# Patient Record
Sex: FEMALE | Race: BLACK OR AFRICAN AMERICAN | NOT HISPANIC OR LATINO
[De-identification: names, ages, dates, MRNs, and addresses within clinical notes are randomized per-mention and may not be internally consistent; named-entity substitution may affect disease eponyms.]

---

## 2018-11-05 ENCOUNTER — RX RENEWAL (OUTPATIENT)
Age: 53
End: 2018-11-05

## 2018-11-05 PROBLEM — Z00.00 ENCOUNTER FOR PREVENTIVE HEALTH EXAMINATION: Status: ACTIVE | Noted: 2018-11-05

## 2019-06-26 ENCOUNTER — RESULT REVIEW (OUTPATIENT)
Age: 54
End: 2019-06-26

## 2021-01-19 ENCOUNTER — RESULT REVIEW (OUTPATIENT)
Age: 56
End: 2021-01-19

## 2021-09-07 ENCOUNTER — EMERGENCY (EMERGENCY)
Facility: HOSPITAL | Age: 56
LOS: 1 days | Discharge: HOME | End: 2021-09-07
Attending: STUDENT IN AN ORGANIZED HEALTH CARE EDUCATION/TRAINING PROGRAM | Admitting: STUDENT IN AN ORGANIZED HEALTH CARE EDUCATION/TRAINING PROGRAM
Payer: COMMERCIAL

## 2021-09-07 VITALS
SYSTOLIC BLOOD PRESSURE: 158 MMHG | DIASTOLIC BLOOD PRESSURE: 85 MMHG | HEART RATE: 70 BPM | TEMPERATURE: 98 F | RESPIRATION RATE: 18 BRPM | OXYGEN SATURATION: 96 %

## 2021-09-07 LAB
ALBUMIN SERPL ELPH-MCNC: 4.8 G/DL — SIGNIFICANT CHANGE UP (ref 3.3–5)
ALP SERPL-CCNC: 90 U/L — SIGNIFICANT CHANGE UP (ref 40–120)
ALT FLD-CCNC: 29 U/L — SIGNIFICANT CHANGE UP (ref 4–33)
ANION GAP SERPL CALC-SCNC: 15 MMOL/L — HIGH (ref 7–14)
AST SERPL-CCNC: 22 U/L — SIGNIFICANT CHANGE UP (ref 4–32)
BASOPHILS # BLD AUTO: 0.04 K/UL — SIGNIFICANT CHANGE UP (ref 0–0.2)
BASOPHILS NFR BLD AUTO: 0.5 % — SIGNIFICANT CHANGE UP (ref 0–2)
BILIRUB SERPL-MCNC: 0.4 MG/DL — SIGNIFICANT CHANGE UP (ref 0.2–1.2)
BUN SERPL-MCNC: 12 MG/DL — SIGNIFICANT CHANGE UP (ref 7–23)
CALCIUM SERPL-MCNC: 10.5 MG/DL — SIGNIFICANT CHANGE UP (ref 8.4–10.5)
CHLORIDE SERPL-SCNC: 101 MMOL/L — SIGNIFICANT CHANGE UP (ref 98–107)
CO2 SERPL-SCNC: 27 MMOL/L — SIGNIFICANT CHANGE UP (ref 22–31)
CREAT SERPL-MCNC: 0.75 MG/DL — SIGNIFICANT CHANGE UP (ref 0.5–1.3)
EOSINOPHIL # BLD AUTO: 0.08 K/UL — SIGNIFICANT CHANGE UP (ref 0–0.5)
EOSINOPHIL NFR BLD AUTO: 1 % — SIGNIFICANT CHANGE UP (ref 0–6)
GLUCOSE SERPL-MCNC: 104 MG/DL — HIGH (ref 70–99)
HCT VFR BLD CALC: 44.2 % — SIGNIFICANT CHANGE UP (ref 34.5–45)
HGB BLD-MCNC: 14.6 G/DL — SIGNIFICANT CHANGE UP (ref 11.5–15.5)
IANC: 5.3 K/UL — SIGNIFICANT CHANGE UP (ref 1.5–8.5)
IMM GRANULOCYTES NFR BLD AUTO: 0.2 % — SIGNIFICANT CHANGE UP (ref 0–1.5)
LYMPHOCYTES # BLD AUTO: 2.38 K/UL — SIGNIFICANT CHANGE UP (ref 1–3.3)
LYMPHOCYTES # BLD AUTO: 28.6 % — SIGNIFICANT CHANGE UP (ref 13–44)
MCHC RBC-ENTMCNC: 29.7 PG — SIGNIFICANT CHANGE UP (ref 27–34)
MCHC RBC-ENTMCNC: 33 GM/DL — SIGNIFICANT CHANGE UP (ref 32–36)
MCV RBC AUTO: 90 FL — SIGNIFICANT CHANGE UP (ref 80–100)
MONOCYTES # BLD AUTO: 0.51 K/UL — SIGNIFICANT CHANGE UP (ref 0–0.9)
MONOCYTES NFR BLD AUTO: 6.1 % — SIGNIFICANT CHANGE UP (ref 2–14)
NEUTROPHILS # BLD AUTO: 5.3 K/UL — SIGNIFICANT CHANGE UP (ref 1.8–7.4)
NEUTROPHILS NFR BLD AUTO: 63.6 % — SIGNIFICANT CHANGE UP (ref 43–77)
NRBC # BLD: 0 /100 WBCS — SIGNIFICANT CHANGE UP
NRBC # FLD: 0 K/UL — SIGNIFICANT CHANGE UP
PLATELET # BLD AUTO: 346 K/UL — SIGNIFICANT CHANGE UP (ref 150–400)
POTASSIUM SERPL-MCNC: 3.8 MMOL/L — SIGNIFICANT CHANGE UP (ref 3.5–5.3)
POTASSIUM SERPL-SCNC: 3.8 MMOL/L — SIGNIFICANT CHANGE UP (ref 3.5–5.3)
PROT SERPL-MCNC: 8.5 G/DL — HIGH (ref 6–8.3)
RBC # BLD: 4.91 M/UL — SIGNIFICANT CHANGE UP (ref 3.8–5.2)
RBC # FLD: 13.3 % — SIGNIFICANT CHANGE UP (ref 10.3–14.5)
SODIUM SERPL-SCNC: 143 MMOL/L — SIGNIFICANT CHANGE UP (ref 135–145)
WBC # BLD: 8.33 K/UL — SIGNIFICANT CHANGE UP (ref 3.8–10.5)
WBC # FLD AUTO: 8.33 K/UL — SIGNIFICANT CHANGE UP (ref 3.8–10.5)

## 2021-09-07 PROCEDURE — 70450 CT HEAD/BRAIN W/O DYE: CPT | Mod: 26

## 2021-09-07 PROCEDURE — 99285 EMERGENCY DEPT VISIT HI MDM: CPT

## 2021-09-07 NOTE — ED PROVIDER NOTE - CLINICAL SUMMARY MEDICAL DECISION MAKING FREE TEXT BOX
Pt is a 55 y/o female c/o a 30 min episode of blurry vision and brain fog this morning that subsequently resolved. R/o intracranial mass with imaging and screen for cardiac events with EKG. Pt is a 55 y/o female c/o a 30 min episode of blurry vision and brain fog this morning that subsequently resolved. R/o intracranial mass with imaging and screen for cardiac events with EKG.    Please see attending attestation.

## 2021-09-07 NOTE — ED PROVIDER NOTE - OBJECTIVE STATEMENT
Pt is a 55 y/o female c/o a 30 min episode of blurry vision and brain fog this morning that subsequently resolved. Pt measured her BP to be 170/92 at that time. Pt also had a bilateral pounding headache 4 days ago that was resolved with Tylenol. Pt went to her PCP today and was told to come to the ER for a CT head scan. Pt denies paresthesias, dizziness, chest pain, SOB, abd pain. PMH includes HTN and HLD. Denies smoking, drinking, and illicit drug use.

## 2021-09-07 NOTE — ED PROVIDER NOTE - NS ED ROS FT
CONSTITUTIONAL: No fever, weight loss, or fatigue  EYES: No eye pain, + Blurry vision, no discharge  ENMT:  No difficulty hearing, tinnitus, vertigo; No sinus or throat pain  NECK: No pain or stiffness  RESPIRATORY: No cough, wheezing, chills or hemoptysis; No shortness of breath  CARDIOVASCULAR: No chest pain, palpitations, dizziness, or leg swelling  GASTROINTESTINAL: No abdominal or epigastric pain. No nausea, vomiting, or hematemesis; No diarrhea or constipation. No melena or hematochezia.  GENITOURINARY: No dysuria, frequency, hematuria, or incontinence  NEUROLOGICAL: + Headaches, no memory loss, loss of strength, numbness, or tremors  SKIN: No itching, burning, rashes, or lesions

## 2021-09-07 NOTE — ED PROVIDER NOTE - PHYSICAL EXAMINATION
GENERAL: well appearing in no acute distress, non-toxic appearing  HEAD: normocephalic, atraumatic  HEENT: normal conjunctiva, oral mucosa moist, uvula midline, no tonsilar exudates, neck supple, no JVD  CARDIAC: regular rate and rhythm, normal S1S2, no appreciable murmurs, 2+ pulses in UE/LE b/l  PULM: normal breath sounds, clear to ascultation bilaterally, no rales, rhonchi, wheezing  GI: abdomen nondistended, soft, nontender, no guarding, rebound tenderness  : no CVA tenderness b/l, no suprapubic tenderness  NEURO: no focal motor or sensory deficits, CN2-12 intact, normal speech, PERRLA, EOMI, AAOx3  MSK: no peripheral edema, no calf tenderness b/l  SKIN: well-perfused, extremities warm, no visible rashes  PSYCH: appropriate mood and affect

## 2021-09-07 NOTE — ED PROVIDER NOTE - ATTENDING CONTRIBUTION TO CARE
I have personally performed a face to face medical and diagnostic evaluation of the patient. I have discussed with and reviewed the Resident's note and agree with the History, ROS, Physical Exam and MDM unless otherwise indicated. A brief summary of my personal evaluation and impression can be found below. I have personally performed a face to face medical and diagnostic evaluation of the patient. I have discussed with and reviewed the Resident's note and agree with the History, ROS, Physical Exam and MDM unless otherwise indicated. A brief summary of my personal evaluation and impression can be found below.    55yo F hx htn hld p/w 30 min episode this AM of blurry vision (unclear if it was unilateral or b/l) not described as loss of vision or blacking of vision w/ episode of her thoughts being "foggy" but still had orientation that self resolved. Also endorsed generalized HA 4 days ago that resolved with tylenol, has hx of similar HA. Has had similar episodes of blurry vision chronically in the past seen by an optho and told had dry eyes and given artificial tears but did not take for this episode. Also has had episodes of this "brain fog" before and attributes it to menopause. Was concerned 2/2 the brain fog and blurry vision happening at the same time which has never happened before. Went to PCP today and sent for CT. Denies any focal weakness/numbness or facial droop or confusion/LOC, cp/sob/palpitations during this episode.   VITALS: Initial triage and subsequent vitals have been reviewed by me.  Gen: Well appearing, NAD, alert, non-toxic  Head: NCAT  HEENT: PERRL, MMM, normal conjunctiva, anicteric, neck supple  Lung: CTAB, no adventitious sounds  CV: RRR, no murmurs, 2+symmetric peripheral pulses  Abd: soft, NTND, no rebound or guarding, no palpable masses  MSK: No edema, no visible deformities  Neuro: Following commands appropriately, fluid speech, CN II-XII grossly intact. 5/5 strength and normal sensation in all extremities. Ambulatory with stable gait.  Skin: Warm and dry, no evidence of rash  Psych: normal mood and affect  Unclear etiology of event, do not suspect stroke/tia/CRAO/amaurosis fugax as lack of features that are typical of stroke presentation and vision changes are not described as loss of vision in a monocular fashion. Brain "fog" not described as loss of orientation/amnesia do not suspect TGA. Will observe, check labs, and CTH and dispo to fu with pcp withs trict return precautions

## 2021-09-07 NOTE — ED PROVIDER NOTE - CARE PLAN
1 Principal Discharge DX:	TTH (tension-type headache)   Principal Discharge DX:	Other headache syndrome

## 2021-09-07 NOTE — ED PROVIDER NOTE - PATIENT PORTAL LINK FT
You can access the FollowMyHealth Patient Portal offered by [facility] by registering at the following website: [email or web address]. By joining [employer]’s FollowMyHealth portal, you will also be able to view your health information using other applications (apps) compatible with our system.

## 2021-09-07 NOTE — ED ADULT NURSE NOTE - OBJECTIVE STATEMENT
received pt in bed A and Ox 3 in NAD resting comfortably, c/o dizziness for the past 5 days, denies Ha, injury, changes in vision, PERRLA extremities are strong and equal b/l. orders noted and completed.

## 2022-10-26 ENCOUNTER — APPOINTMENT (OUTPATIENT)
Dept: ORTHOPEDIC SURGERY | Facility: CLINIC | Age: 57
End: 2022-10-26

## 2022-10-26 VITALS — WEIGHT: 235 LBS | BODY MASS INDEX: 37.77 KG/M2 | HEIGHT: 66 IN

## 2022-10-26 DIAGNOSIS — S43.51XA SPRAIN OF RIGHT ACROMIOCLAVICULAR JOINT, INITIAL ENCOUNTER: ICD-10-CM

## 2022-10-26 DIAGNOSIS — I10 ESSENTIAL (PRIMARY) HYPERTENSION: ICD-10-CM

## 2022-10-26 DIAGNOSIS — K21.9 GASTRO-ESOPHAGEAL REFLUX DISEASE W/OUT ESOPHAGITIS: ICD-10-CM

## 2022-10-26 DIAGNOSIS — E78.00 PURE HYPERCHOLESTEROLEMIA, UNSPECIFIED: ICD-10-CM

## 2022-10-26 PROCEDURE — 73030 X-RAY EXAM OF SHOULDER: CPT | Mod: RT

## 2022-10-26 PROCEDURE — 73010 X-RAY EXAM OF SHOULDER BLADE: CPT | Mod: RT

## 2022-10-26 PROCEDURE — 99204 OFFICE O/P NEW MOD 45 MIN: CPT

## 2022-10-26 RX ORDER — CYCLOBENZAPRINE HYDROCHLORIDE 5 MG/1
5 TABLET, FILM COATED ORAL
Qty: 21 | Refills: 0 | Status: ACTIVE | COMMUNITY
Start: 2022-05-03

## 2022-10-26 RX ORDER — BUDESONIDE AND FORMOTEROL FUMARATE DIHYDRATE 160; 4.5 UG/1; UG/1
160-4.5 AEROSOL RESPIRATORY (INHALATION)
Qty: 10 | Refills: 0 | Status: ACTIVE | COMMUNITY
Start: 2022-09-28

## 2022-10-26 RX ORDER — COVID-19 MOLECULAR TEST ASSAY
KIT MISCELLANEOUS
Qty: 1 | Refills: 0 | Status: ACTIVE | COMMUNITY
Start: 2022-05-26

## 2022-10-26 RX ORDER — ATENOLOL 50 MG/1
50 TABLET ORAL
Qty: 90 | Refills: 0 | Status: ACTIVE | COMMUNITY
Start: 2022-06-16

## 2022-10-26 RX ORDER — LOSARTAN POTASSIUM 50 MG/1
50 TABLET, FILM COATED ORAL
Qty: 30 | Refills: 0 | Status: ACTIVE | COMMUNITY
Start: 2022-02-03

## 2022-10-26 RX ORDER — IBUPROFEN 600 MG/1
600 TABLET, FILM COATED ORAL
Qty: 30 | Refills: 0 | Status: ACTIVE | COMMUNITY
Start: 2022-10-04

## 2022-10-26 RX ORDER — DICLOFENAC SODIUM 75 MG/1
75 TABLET, DELAYED RELEASE ORAL TWICE DAILY
Qty: 60 | Refills: 3 | Status: ACTIVE | COMMUNITY
Start: 2022-10-26 | End: 1900-01-01

## 2022-10-26 RX ORDER — OMEPRAZOLE 20 MG/1
20 CAPSULE, DELAYED RELEASE ORAL
Qty: 90 | Refills: 0 | Status: ACTIVE | COMMUNITY
Start: 2022-05-31

## 2022-10-26 RX ORDER — AZITHROMYCIN 250 MG/1
250 TABLET, FILM COATED ORAL
Qty: 6 | Refills: 0 | Status: ACTIVE | COMMUNITY
Start: 2022-10-04

## 2022-10-26 RX ORDER — ATORVASTATIN CALCIUM 10 MG/1
10 TABLET, FILM COATED ORAL
Qty: 90 | Refills: 0 | Status: ACTIVE | COMMUNITY
Start: 2022-06-16

## 2022-10-26 RX ORDER — MONTELUKAST 10 MG/1
10 TABLET, FILM COATED ORAL
Qty: 90 | Refills: 0 | Status: ACTIVE | COMMUNITY
Start: 2022-09-28

## 2022-10-26 RX ORDER — ALBUTEROL SULFATE 90 UG/1
108 (90 BASE) INHALANT RESPIRATORY (INHALATION)
Qty: 8 | Refills: 0 | Status: ACTIVE | COMMUNITY
Start: 2022-09-28

## 2022-10-26 NOTE — ASSESSMENT
[FreeTextEntry1] : R RC tendonitis. AC jt pain.\par Activity modification.\par Ice.\par Trial of PT.\par Diclofenac BID prn.\par Consider SA injection.\par RTO 6 weeks.\par

## 2022-10-26 NOTE — PHYSICAL EXAM
[Right] : right shoulder [5 ___] : forward flexion 5[unfilled]/5 [5___] : internal rotation 5[unfilled]/5 [] : no pain with strength testing [FreeTextEntry9] : \par ER 50

## 2022-10-26 NOTE — HISTORY OF PRESENT ILLNESS
[Sudden] : sudden [9] : 9 [5] : 5 [Dull/Aching] : dull/aching [Radiating] : radiating [Constant] : constant [Leisure] : leisure [Sleep] : sleep [Heat] : heat [de-identified] : 10/26/22: 58 yo RHD female with right shoulder pain since Aug 2022. Denies specific injury. She reports pain into her upper arm. She reports hearing a cracking sound when moving her arm. She has pain the morning. She saw her PMD and was referred her. She used heat and did HEP advised by a PT friend. She occasionally takes Advil but with minimal relief. [] : Post Surgical Visit: no [FreeTextEntry1] : right shoulder [FreeTextEntry3] : august 2022 [FreeTextEntry5] : No known injury. Patient did state she had surgery in 2011 for her right shoulder [FreeTextEntry7] : humerus [de-identified] : activity [de-identified] : 2011

## 2022-12-14 ENCOUNTER — APPOINTMENT (OUTPATIENT)
Dept: ORTHOPEDIC SURGERY | Facility: CLINIC | Age: 57
End: 2022-12-14

## 2022-12-14 VITALS — HEIGHT: 66 IN | BODY MASS INDEX: 37.77 KG/M2 | WEIGHT: 235 LBS

## 2022-12-14 PROCEDURE — 99213 OFFICE O/P EST LOW 20 MIN: CPT

## 2022-12-14 NOTE — ASSESSMENT
[FreeTextEntry1] : R RC tendonitis. AC jt pain.\par Activity modification.\par Ice.\par PT is helping, will continue.\par D/c diclofenac.  Will take Tylenol prn.\par Consider SA injection.\par RTO 6-8 weeks.\par

## 2022-12-14 NOTE — HISTORY OF PRESENT ILLNESS
[6] : 6 [5] : 5 [Sharp] : sharp [de-identified] : 12/14/22: Here for follow up. She feels a little better since starting PT. She took diclofenac but it raised her BP.\par \par 10/26/22: 58 yo RHD female with right shoulder pain since Aug 2022. Denies specific injury. She reports pain into her upper arm. She reports hearing a cracking sound when moving her arm. She has pain the morning. She saw her PMD and was referred her. She used heat and did HEP advised by a PT friend. She occasionally takes Advil but with minimal relief. [] : Post Surgical Visit: no [FreeTextEntry1] : right shoulder [de-identified] : physical therapy

## 2023-02-08 ENCOUNTER — APPOINTMENT (OUTPATIENT)
Dept: ORTHOPEDIC SURGERY | Facility: CLINIC | Age: 58
End: 2023-02-08
Payer: COMMERCIAL

## 2023-02-08 VITALS — WEIGHT: 235 LBS | HEIGHT: 66 IN | BODY MASS INDEX: 37.77 KG/M2

## 2023-02-08 PROCEDURE — 99214 OFFICE O/P EST MOD 30 MIN: CPT

## 2023-02-08 NOTE — HISTORY OF PRESENT ILLNESS
[6] : 6 [5] : 5 [Sharp] : sharp [de-identified] : 2/8/23: Here for follow up.  She did about 14 sessions of PT with some improvement.  She reports pain of 3 of 10 today, pain in the upper arm. She reports a similar pain in the left shoulder as well.  \par \par 12/14/22: Here for follow up. She feels a little better since starting PT. She took diclofenac but it raised her BP.\par \par 10/26/22: 56 yo RHD female with right shoulder pain since Aug 2022. Denies specific injury. She reports pain into her upper arm. She reports hearing a cracking sound when moving her arm. She has pain the morning. She saw her PMD and was referred her. She used heat and did HEP advised by a PT friend. She occasionally takes Advil but with minimal relief. [] : Post Surgical Visit: no [FreeTextEntry1] : right shoulder [de-identified] : physical therapy

## 2023-02-08 NOTE — PHYSICAL EXAM
[5 ___] : forward flexion 5[unfilled]/5 [5___] : internal rotation 5[unfilled]/5 [Bilateral] : shoulder bilaterally [] : no pain with strength testing [FreeTextEntry9] : FE R 150, L 150\par ER R 50, L 50

## 2023-02-08 NOTE — ASSESSMENT
[FreeTextEntry1] : R RC tendonitis. AC jt pain.\par Now with L RC tendonitis. \par Activity modification.\par Ice.\par She did PT with improvement.  \par D/c diclofenac.  Will take Tylenol prn.\par Consider SA injection.\par RTO for review, xrays L shoulder.\par \par

## 2023-03-16 ENCOUNTER — RESULT REVIEW (OUTPATIENT)
Age: 58
End: 2023-03-16

## 2023-04-05 ENCOUNTER — APPOINTMENT (OUTPATIENT)
Dept: ORTHOPEDIC SURGERY | Facility: CLINIC | Age: 58
End: 2023-04-05
Payer: COMMERCIAL

## 2023-04-05 ENCOUNTER — NON-APPOINTMENT (OUTPATIENT)
Age: 58
End: 2023-04-05

## 2023-04-05 VITALS — BODY MASS INDEX: 37.77 KG/M2 | WEIGHT: 235 LBS | HEIGHT: 66 IN

## 2023-04-05 DIAGNOSIS — M75.81 OTHER SHOULDER LESIONS, RIGHT SHOULDER: ICD-10-CM

## 2023-04-05 DIAGNOSIS — J45.909 UNSPECIFIED ASTHMA, UNCOMPLICATED: ICD-10-CM

## 2023-04-05 DIAGNOSIS — M75.82 OTHER SHOULDER LESIONS, LEFT SHOULDER: ICD-10-CM

## 2023-04-05 DIAGNOSIS — Z78.9 OTHER SPECIFIED HEALTH STATUS: ICD-10-CM

## 2023-04-05 PROCEDURE — 99214 OFFICE O/P EST MOD 30 MIN: CPT

## 2023-04-05 NOTE — HISTORY OF PRESENT ILLNESS
[5] : 5 [4] : 4 [Dull/Aching] : dull/aching [Sharp] : sharp [Stabbing] : stabbing [de-identified] : 2/8/23: Here for follow up.  She did about 14 sessions of PT with some improvement.  She reports pain of 3 of 10 today, pain in the upper arm. She reports a similar pain in the left shoulder as well.  \par \par 12/14/22: Here for follow up. She feels a little better since starting PT. She took diclofenac but it raised her BP.\par \par 10/26/22: 58 yo RHD female with right shoulder pain since Aug 2022. Denies specific injury. She reports pain into her upper arm. She reports hearing a cracking sound when moving her arm. She has pain the morning. She saw her PMD and was referred her. She used heat and did HEP advised by a PT friend. She occasionally takes Advil but with minimal relief. [] : Post Surgical Visit: no [FreeTextEntry1] : right shoulder  [de-identified] : none

## 2023-04-05 NOTE — ASSESSMENT
[FreeTextEntry1] : R RC tendonitis. AC jt pain.\par L RC tendonitis, improved \par MRI R shoulder - RC tendonitis, labral tearing, capsular thickening.\par Activity modification.\par Ice.\par She will resume PT.\par Tylenol prn.\par Consider SA injection if pain worsens.\par RTO prn.\par \par

## 2023-04-05 NOTE — DATA REVIEWED
[MRI] : MRI [Right] : of the right [Shoulder] : shoulder [I independently reviewed and interpreted images and report] : I independently reviewed and interpreted images and report [FreeTextEntry1] : RC tendonitis, labral tearing, capsular thickening

## 2023-04-05 NOTE — PHYSICAL EXAM
[Bilateral] : shoulder bilaterally [5 ___] : forward flexion 5[unfilled]/5 [5___] : internal rotation 5[unfilled]/5 [] : no pain with strength testing [FreeTextEntry9] : FE R 150, L 150\par ER R 50, L 50

## 2024-02-15 ENCOUNTER — EMERGENCY (EMERGENCY)
Facility: HOSPITAL | Age: 59
LOS: 1 days | Discharge: HOME | End: 2024-02-15
Attending: INTERNAL MEDICINE | Admitting: INTERNAL MEDICINE
Payer: COMMERCIAL

## 2024-02-15 VITALS
DIASTOLIC BLOOD PRESSURE: 102 MMHG | SYSTOLIC BLOOD PRESSURE: 186 MMHG | WEIGHT: 229.94 LBS | RESPIRATION RATE: 20 BRPM | OXYGEN SATURATION: 96 % | TEMPERATURE: 97 F | HEART RATE: 64 BPM | HEIGHT: 66 IN

## 2024-02-15 LAB
ALBUMIN SERPL ELPH-MCNC: 4 G/DL — SIGNIFICANT CHANGE UP (ref 3.3–5)
ALP SERPL-CCNC: 101 U/L — SIGNIFICANT CHANGE UP (ref 40–120)
ALT FLD-CCNC: 29 U/L — SIGNIFICANT CHANGE UP (ref 12–78)
ANION GAP SERPL CALC-SCNC: 4 MMOL/L — LOW (ref 5–17)
AST SERPL-CCNC: 19 U/L — SIGNIFICANT CHANGE UP (ref 15–37)
BASOPHILS # BLD AUTO: 0.06 K/UL — SIGNIFICANT CHANGE UP (ref 0–0.2)
BASOPHILS NFR BLD AUTO: 0.6 % — SIGNIFICANT CHANGE UP (ref 0–2)
BILIRUB SERPL-MCNC: 0.3 MG/DL — SIGNIFICANT CHANGE UP (ref 0.2–1.2)
BUN SERPL-MCNC: 12 MG/DL — SIGNIFICANT CHANGE UP (ref 7–23)
CALCIUM SERPL-MCNC: 9.7 MG/DL — SIGNIFICANT CHANGE UP (ref 8.5–10.1)
CHLORIDE SERPL-SCNC: 108 MMOL/L — SIGNIFICANT CHANGE UP (ref 96–108)
CO2 SERPL-SCNC: 29 MMOL/L — SIGNIFICANT CHANGE UP (ref 22–31)
CREAT SERPL-MCNC: 0.88 MG/DL — SIGNIFICANT CHANGE UP (ref 0.5–1.3)
EGFR: 76 ML/MIN/1.73M2 — SIGNIFICANT CHANGE UP
EOSINOPHIL # BLD AUTO: 0.07 K/UL — SIGNIFICANT CHANGE UP (ref 0–0.5)
EOSINOPHIL NFR BLD AUTO: 0.8 % — SIGNIFICANT CHANGE UP (ref 0–6)
GLUCOSE SERPL-MCNC: 107 MG/DL — HIGH (ref 70–99)
HCT VFR BLD CALC: 43.5 % — SIGNIFICANT CHANGE UP (ref 34.5–45)
HGB BLD-MCNC: 14.1 G/DL — SIGNIFICANT CHANGE UP (ref 11.5–15.5)
IMM GRANULOCYTES NFR BLD AUTO: 0.2 % — SIGNIFICANT CHANGE UP (ref 0–0.9)
LYMPHOCYTES # BLD AUTO: 2.92 K/UL — SIGNIFICANT CHANGE UP (ref 1–3.3)
LYMPHOCYTES # BLD AUTO: 31.6 % — SIGNIFICANT CHANGE UP (ref 13–44)
MCHC RBC-ENTMCNC: 29.3 PG — SIGNIFICANT CHANGE UP (ref 27–34)
MCHC RBC-ENTMCNC: 32.4 GM/DL — SIGNIFICANT CHANGE UP (ref 32–36)
MCV RBC AUTO: 90.2 FL — SIGNIFICANT CHANGE UP (ref 80–100)
MONOCYTES # BLD AUTO: 0.54 K/UL — SIGNIFICANT CHANGE UP (ref 0–0.9)
MONOCYTES NFR BLD AUTO: 5.8 % — SIGNIFICANT CHANGE UP (ref 2–14)
NEUTROPHILS # BLD AUTO: 5.64 K/UL — SIGNIFICANT CHANGE UP (ref 1.8–7.4)
NEUTROPHILS NFR BLD AUTO: 61 % — SIGNIFICANT CHANGE UP (ref 43–77)
NRBC # BLD: 0 /100 WBCS — SIGNIFICANT CHANGE UP (ref 0–0)
PLATELET # BLD AUTO: 341 K/UL — SIGNIFICANT CHANGE UP (ref 150–400)
POTASSIUM SERPL-MCNC: 3.8 MMOL/L — SIGNIFICANT CHANGE UP (ref 3.5–5.3)
POTASSIUM SERPL-SCNC: 3.8 MMOL/L — SIGNIFICANT CHANGE UP (ref 3.5–5.3)
PROT SERPL-MCNC: 8.6 G/DL — HIGH (ref 6–8.3)
RBC # BLD: 4.82 M/UL — SIGNIFICANT CHANGE UP (ref 3.8–5.2)
RBC # FLD: 13.2 % — SIGNIFICANT CHANGE UP (ref 10.3–14.5)
SODIUM SERPL-SCNC: 141 MMOL/L — SIGNIFICANT CHANGE UP (ref 135–145)
TROPONIN I, HIGH SENSITIVITY RESULT: 12.9 NG/L — SIGNIFICANT CHANGE UP
WBC # BLD: 9.25 K/UL — SIGNIFICANT CHANGE UP (ref 3.8–10.5)
WBC # FLD AUTO: 9.25 K/UL — SIGNIFICANT CHANGE UP (ref 3.8–10.5)

## 2024-02-15 PROCEDURE — 71045 X-RAY EXAM CHEST 1 VIEW: CPT | Mod: 26

## 2024-02-15 PROCEDURE — 70450 CT HEAD/BRAIN W/O DYE: CPT | Mod: 26,MA

## 2024-02-15 PROCEDURE — 99285 EMERGENCY DEPT VISIT HI MDM: CPT

## 2024-02-15 RX ORDER — METOPROLOL TARTRATE 50 MG
25 TABLET ORAL ONCE
Refills: 0 | Status: COMPLETED | OUTPATIENT
Start: 2024-02-15 | End: 2024-02-15

## 2024-02-15 RX ADMIN — Medication 25 MILLIGRAM(S): at 23:27

## 2024-02-15 NOTE — ED ADULT TRIAGE NOTE - CHIEF COMPLAINT QUOTE
Patient complaining of high BP, headache, R ear pain, brain fog. States compliant with medication, atenolol 50mg once daily.

## 2024-02-15 NOTE — ED ADULT NURSE NOTE - HOW OFTEN DO YOU HAVE SIX OR MORE DRINKS ON ONE OCCASION?
“Patient's name, , procedure and correct site were confirmed during the [place] Timeout.”
“Patient's name, , procedure and correct site were confirmed during the [place] Timeout.”
Less than Monthly

## 2024-02-15 NOTE — ED ADULT NURSE NOTE - NSFALLUNIVINTERV_ED_ALL_ED
Bed/Stretcher in lowest position, wheels locked, appropriate side rails in place/Call bell, personal items and telephone in reach/Instruct patient to call for assistance before getting out of bed/chair/stretcher/Non-slip footwear applied when patient is off stretcher/[place] to call system/Physically safe environment - no spills, clutter or unnecessary equipment/Purposeful proactive rounding/Room/bathroom lighting operational, light cord in reach

## 2024-02-15 NOTE — ED PROVIDER NOTE - CLINICAL SUMMARY MEDICAL DECISION MAKING FREE TEXT BOX
57 y/o female H/O HTN, for the past two days her BP has been labile. She was seen by the PMD today and BP systolic was 159, she takes Metoroprolol 50mg daily. She came to the ED because she is experiencing  headache, R ear pain, brain fog. States compliant with medication, atenolol 50mg once daily.  BP in triage 186/102.  no chest pain, no SOB, no palpitations, no n/v,  no stroke like symptoms VSS Exam is normal , labs, ekg, CT scan are normal, treated with additional dose of  lopressor 25mg in the ED. Patient was stable for discharge to O/P follow up.

## 2024-02-15 NOTE — ED PROVIDER NOTE - OBJECTIVE STATEMENT
Patient complaining of high BP, headache, R ear pain, brain fog. States compliant with medication, atenolol 50mg once daily.  hypertension 59 y/o female H/O HTN, for the past two days her BP has been labile. She was seen by the PMD today and BP systolic was 159, she takes Metoroprolol 50mg daily. She came to the ED because she is experiencing  headache, R ear pain, brain fog. States compliant with medication, atenolol 50mg once daily.  BP in triage 57 y/o female H/O HTN, for the past two days her BP has been labile. She was seen by the PMD today and BP systolic was 159, she takes Metoroprolol 50mg daily. She came to the ED because she is experiencing  headache, R ear pain, brain fog. States compliant with medication, atenolol 50mg once daily.  BP in triage 186/102.  no chest pain, no SOB, no palpitations, no n/v,  no stroke like symptoms

## 2024-02-15 NOTE — ED PROVIDER NOTE - NSFOLLOWUPINSTRUCTIONS_ED_ALL_ED_FT
Follow up with Dr Ferreira in the morning for BP management   Mrs Hui was given lopressor 25mg in the emergency room.   The CT scan of the head was normal   Return to the hospital for increased symptoms or any changes in condition

## 2024-02-15 NOTE — ED PROVIDER NOTE - PATIENT PORTAL LINK FT
You can access the FollowMyHealth Patient Portal offered by [facility] by registering at the following website: [email or web address]. By joining [employer]’s FollowMyHealth portal, you will also be able to view your health information using other applications (apps) compatible with our system.

## 2024-02-15 NOTE — ED PROVIDER NOTE - NEUROLOGICAL, MLM
[place] [address]  [address] [address]  Phone: [phone]  Fax: [phone]    September 14, 2023        Rashmi Oliva  [address] [place] [address]          To whom it may concern:    RE: Rashmi Oliva    Should be excused from work until Monday Sept 18th.  At which point she can return with no restrictions.    Please contact me for questions or concerns.      Sincerely,    Arvin Harmon MD   Alert and oriented, no focal deficits, no motor or sensory deficits.

## 2024-02-15 NOTE — ED ADULT TRIAGE NOTE - SOURCE OF INFORMATION
Recommendation Preamble: The following recommendations were made: Detail Level: Detailed Recommendations (Free Text): Daily sunscreen spf 30 or better reapply every 60 to 80 minutes while outside for extended periods of time Patient

## 2024-02-15 NOTE — ED ADULT NURSE REASSESSMENT NOTE - NS ED NURSE REASSESS COMMENT FT1
Report received from previous RN.  Assumed patient care at this time @2300.  Patient is alert to person, place, and time. Daughter at bedside. Denies headache, blurry vision, dizziness. No neurological deficits noted. VS stable (see flowsheet).  pt @ CT.

## 2024-02-16 VITALS — DIASTOLIC BLOOD PRESSURE: 81 MMHG | SYSTOLIC BLOOD PRESSURE: 157 MMHG

## 2024-02-16 PROCEDURE — 80053 COMPREHEN METABOLIC PANEL: CPT

## 2024-02-16 PROCEDURE — 93010 ELECTROCARDIOGRAM REPORT: CPT

## 2024-02-16 PROCEDURE — 71045 X-RAY EXAM CHEST 1 VIEW: CPT

## 2024-02-16 PROCEDURE — 99285 EMERGENCY DEPT VISIT HI MDM: CPT | Mod: 25

## 2024-02-16 PROCEDURE — 36415 COLL VENOUS BLD VENIPUNCTURE: CPT

## 2024-02-16 PROCEDURE — 93005 ELECTROCARDIOGRAM TRACING: CPT

## 2024-02-16 PROCEDURE — 70450 CT HEAD/BRAIN W/O DYE: CPT | Mod: MA

## 2024-02-16 PROCEDURE — 84484 ASSAY OF TROPONIN QUANT: CPT

## 2024-02-16 PROCEDURE — 85025 COMPLETE CBC W/AUTO DIFF WBC: CPT
